# Patient Record
Sex: MALE | Race: WHITE | URBAN - METROPOLITAN AREA
[De-identification: names, ages, dates, MRNs, and addresses within clinical notes are randomized per-mention and may not be internally consistent; named-entity substitution may affect disease eponyms.]

---

## 2017-02-16 ENCOUNTER — OFFICE VISIT (OUTPATIENT)
Dept: INTERNAL MEDICINE | Facility: CLINIC | Age: 22
End: 2017-02-16
Payer: COMMERCIAL

## 2017-02-16 VITALS
OXYGEN SATURATION: 97 % | BODY MASS INDEX: 19.67 KG/M2 | DIASTOLIC BLOOD PRESSURE: 58 MMHG | HEART RATE: 87 BPM | TEMPERATURE: 98.1 F | WEIGHT: 132.8 LBS | HEIGHT: 69 IN | SYSTOLIC BLOOD PRESSURE: 110 MMHG

## 2017-02-16 DIAGNOSIS — F31.9 BIPOLAR 1 DISORDER (H): Primary | ICD-10-CM

## 2017-02-16 PROCEDURE — 99203 OFFICE O/P NEW LOW 30 MIN: CPT | Performed by: INTERNAL MEDICINE

## 2017-02-16 RX ORDER — DIVALPROEX SODIUM 500 MG/1
1000 TABLET, EXTENDED RELEASE ORAL DAILY
Qty: 60 TABLET | Refills: 2 | Status: SHIPPED | OUTPATIENT
Start: 2017-02-16 | End: 2018-06-18

## 2017-02-16 NOTE — NURSING NOTE
"Chief Complaint   Patient presents with     Establish Care     Needs medication for bipolar.       Initial /58 (BP Location: Right arm, Patient Position: Chair, Cuff Size: Adult Large)  Pulse 87  Temp 98.1  F (36.7  C) (Oral)  Ht 5' 9\" (1.753 m)  Wt 132 lb 12.8 oz (60.2 kg)  SpO2 97%  BMI 19.61 kg/m2 Estimated body mass index is 19.61 kg/(m^2) as calculated from the following:    Height as of this encounter: 5' 9\" (1.753 m).    Weight as of this encounter: 132 lb 12.8 oz (60.2 kg).  Medication Reconciliation: complete   Alisha Hopson MA      "

## 2017-02-16 NOTE — PROGRESS NOTES
"  SUBJECTIVE:                                                    Quinn Freeman is a 21 year old male who presents to clinic today for the following health issues:      He is a new patient to establish care. He has bipolar disorder and has been having problems getting his medication refilled from his psychiatrist, Dr. Cuellar. He would be interested in following here with our psych NP. He has been doing very well on current medication for the past year.     No other concerns.   No other significant medical problems.     Patient Active Problem List   Diagnosis     Attention deficit disorder     Health Care Home     Bipolar 1 disorder (H)     Current Outpatient Prescriptions   Medication Sig Dispense Refill     divalproex (DEPAKOTE ER) 500 MG 24 hr tablet Take 2 tablets (1,000 mg) by mouth daily 60 tablet 2      Social History   Substance Use Topics     Smoking status: Passive Smoke Exposure - Never Smoker     Smokeless tobacco: Never Used     Alcohol use No          ROS:  negative    OBJECTIVE:                                                    /58 (BP Location: Right arm, Patient Position: Chair, Cuff Size: Adult Large)  Pulse 87  Temp 98.1  F (36.7  C) (Oral)  Ht 5' 9\" (1.753 m)  Wt 132 lb 12.8 oz (60.2 kg)  SpO2 97%  BMI 19.61 kg/m2  Body mass index is 19.61 kg/(m^2).      Not examined.      ASSESSMENT/PLAN:                                                            1. Bipolar 1 disorder (H)  Will order med for now and refer for psych care.   - divalproex (DEPAKOTE ER) 500 MG 24 hr tablet; Take 2 tablets (1,000 mg) by mouth daily  Dispense: 60 tablet; Refill: 2  - MENTAL HEALTH REFERRAL        Milka Smith MD  [facility]    "

## 2017-02-16 NOTE — MR AVS SNAPSHOT
After Visit Summary   2/16/2017    Quinn Freeman    MRN: [id number]           Patient Information     Date Of Birth          1995        Visit Information        Provider Department      2/16/2017 5:00 PM Milka Smith MD [facility]        Today's Diagnoses     Bipolar 1 disorder (H)    -  1       Follow-ups after your visit        Additional Services     MENTAL HEALTH REFERRAL       Your provider has referred you to: [facility]: [facility] [phone]   [email or web address]   *Referral from [facility] Primary Care Provider required - Consultation Model - medication management & future refills will be returned to [facility] PCP upon completion of evaluation  *Please call to schedule an appointment.   With SOTERO Guerrero CNP    All scheduling is subject to the client's specific insurance plan & benefits, provider/location availability, and provider clinical specialities.  Please arrive 15 minutes early for your first appointment and bring your completed paperwork.    Please be aware that coverage of these services is subject to the terms and limitations of your health insurance plan.  Call member services at your health plan with any benefit or coverage questions.                  Who to contact     If you have questions or need follow up information about today's clinic visit or your schedule please contact [facility] directly at [phone].  Normal or non-critical lab and imaging results will be communicated to you by MyChart, letter or phone within 4 business days after the clinic has received the results. If you do not hear from us within 7 days, please contact the clinic through MyChart or phone. If you have a critical or abnormal lab result, we will notify you by phone as soon as possible.  Submit refill requests through [employer] or  "call your pharmacy and they will forward the refill request to us. Please allow 3 business days for your refill to be completed.          Additional Information About Your Visit        MyChart Information     [employer] lets you send messages to your doctor, view your test results, renew your prescriptions, schedule appointments and more. To sign up, go to [email or web address] . Click on \"Log in\" on the left side of the screen, which will take you to the Welcome page. Then click on \"Sign up Now\" on the right side of the page.     You will be asked to enter the access code listed below, as well as some personal information. Please follow the directions to create your username and password.     Your access code is: [email or web address]  Expires: 2017  5:03 PM     Your access code will  in 90 days. If you need help or a new code, please call your [facility] clinic or [phone].        Care EveryWhere ID     This is your Care EveryWhere ID. This could be used by other organizations to access your [facility] medical records  [id number]        Your Vitals Were     Pulse Temperature Height Pulse Oximetry BMI (Body Mass Index)       87 98.1  F (36.7  C) (Oral) 5' 9\" (1.753 m) 97% 19.61 kg/m2        Blood Pressure from Last 3 Encounters:   17 110/58   07/16/15 103/66   12 102/68    Weight from Last 3 Encounters:   17 132 lb 12.8 oz (60.2 kg)   07/10/15 129 lb (58.5 kg)   12 128 lb (58.1 kg) (20 %)*     * Growth percentiles are based on CDC 2-20 Years data.              We Performed the Following     MENTAL HEALTH REFERRAL          Today's Medication Changes          These changes are accurate as of: 17  5:03 PM.  If you have any questions, ask your nurse or doctor.               These medicines have changed or have updated prescriptions.        Dose/Directions    * DEPAKOTE PO   This may have changed:  Another medication with the same name was added. Make sure you understand how and when to " take each.        Dose:  500 mg   Take 500 mg by mouth 2 times daily Reported on 2/16/2017   Refills:  0       * divalproex 500 MG 24 hr tablet   Commonly known as:  DEPAKOTE ER   This may have changed:  You were already taking a medication with the same name, and this prescription was added. Make sure you understand how and when to take each.   Used for:  Bipolar 1 disorder (H)   Changed by:  Milka Smith MD        Dose:  1000 mg   Take 2 tablets (1,000 mg) by mouth daily   Quantity:  60 tablet   Refills:  2       * Notice:  This list has 2 medication(s) that are the same as other medications prescribed for you. Read the directions carefully, and ask your doctor or other care provider to review them with you.         Where to get your medicines      Some of these will need a paper prescription and others can be bought over the counter.  Ask your nurse if you have questions.     Bring a paper prescription for each of these medications     divalproex 500 MG 24 hr tablet                Primary Care Provider Office Phone # Fax #    Prashant Lind [phone]       [address]  [address]  [facility] [address]        Thank you!     Thank you for choosing [facility]  for your care. Our goal is always to provide you with excellent care. Hearing back from our patients is one way we can continue to improve our services. Please take a few minutes to complete the written survey that you may receive in the mail after your visit with us. Thank you!             Your Updated Medication List - Protect others around you: Learn how to safely use, store and throw away your medicines at www.disposemymeds.org.          This list is accurate as of: 2/16/17  5:03 PM.  Always use your most recent med list.                   Brand Name Dispense Instructions for use    * DEPAKOTE PO      Take 500 mg by mouth 2 times daily Reported on 2/16/2017       * divalproex 500 MG 24 hr tablet    DEPAKOTE ER     60 tablet    Take 2 tablets (1,000 mg) by mouth daily       * lithium 300 MG CR tablet    ESKALITH/LITHOBID    60 tablet    Take 2 tablets (600 mg) by mouth every evening       * lithium 300 MG CR tablet    ESKALITH/LITHOBID    60 tablet    Take 2 tablets (600 mg) by mouth every morning       OLANZapine 10 MG tablet    zyPREXA    30 tablet    Take 1 tablet (10 mg) by mouth At Bedtime       * Notice:  This list has 4 medication(s) that are the same as other medications prescribed for you. Read the directions carefully, and ask your doctor or other care provider to review them with you.

## 2018-06-18 ENCOUNTER — OFFICE VISIT (OUTPATIENT)
Dept: FAMILY MEDICINE | Facility: CLINIC | Age: 23
End: 2018-06-18
Payer: COMMERCIAL

## 2018-06-18 VITALS
SYSTOLIC BLOOD PRESSURE: 120 MMHG | HEIGHT: 70 IN | OXYGEN SATURATION: 97 % | TEMPERATURE: 98.9 F | BODY MASS INDEX: 19.76 KG/M2 | WEIGHT: 138 LBS | DIASTOLIC BLOOD PRESSURE: 70 MMHG | RESPIRATION RATE: 18 BRPM | HEART RATE: 84 BPM

## 2018-06-18 DIAGNOSIS — F31.9 BIPOLAR 1 DISORDER (H): ICD-10-CM

## 2018-06-18 DIAGNOSIS — Z00.01 ENCOUNTER FOR ROUTINE ADULT HEALTH EXAMINATION WITH ABNORMAL FINDINGS: Primary | ICD-10-CM

## 2018-06-18 PROCEDURE — 99395 PREV VISIT EST AGE 18-39: CPT | Performed by: PHYSICIAN ASSISTANT

## 2018-06-18 PROCEDURE — 99213 OFFICE O/P EST LOW 20 MIN: CPT | Mod: 25 | Performed by: PHYSICIAN ASSISTANT

## 2018-06-18 RX ORDER — DIVALPROEX SODIUM 500 MG/1
1000 TABLET, EXTENDED RELEASE ORAL DAILY
Qty: 60 TABLET | Refills: 2 | Status: SHIPPED | OUTPATIENT
Start: 2018-06-18

## 2018-06-18 ASSESSMENT — ENCOUNTER SYMPTOMS
HEMATURIA: 0
DYSURIA: 0
CONSTIPATION: 0
DIARRHEA: 0
HEADACHES: 0
EYE PAIN: 0
NAUSEA: 0
ARTHRALGIAS: 0
SORE THROAT: 0
PALPITATIONS: 1
COUGH: 1
SHORTNESS OF BREATH: 0
FREQUENCY: 0
NERVOUS/ANXIOUS: 0
ABDOMINAL PAIN: 0
PARESTHESIAS: 0
HEMATOCHEZIA: 0
FEVER: 0
JOINT SWELLING: 0
CHILLS: 0
DIZZINESS: 0
WEAKNESS: 0
MYALGIAS: 0
HEARTBURN: 0

## 2018-06-18 NOTE — PATIENT INSTRUCTIONS
Juaquin,    Really nice to meet you. I do want to encourage you to see your psychiatrist to help with mood changes you discussed.  Depakote is one option, like the prescription from 6/9/18 that you showed me, and the one that you were prescribed here at [facility] in 2017. I will refill this for 3 months until you can get back in with a psychiatrist.      Preventive Health Recommendations  Male Ages 18 - 25     Yearly exam:             See your health care provider every year in order to  o   Review health changes.   o   Discuss preventive care.    o   Review your medicines if your doctor has prescribed any.    You should be tested each year for STDs (sexually transmitted diseases).     Talk to your provider about cholesterol testing.      If you are at risk for diabetes, you should have a diabetes test (fasting glucose).    Shots: Get a flu shot each year. Get a tetanus shot every 10 years.     Nutrition:    Eat at least 5 servings of fruits and vegetables daily.     Eat whole-grain bread, whole-wheat pasta and brown rice instead of white grains and rice.     Talk to your provider about calcium and Vitamin D.     Lifestyle    Exercise for at least 150 minutes a week (30 minutes a day, 5 days a week). This will help you control your weight and prevent disease.     Limit alcohol to one drink per day.     No smoking.     Wear sunscreen to prevent skin cancer.     See your dentist every six months for an exam and cleaning.

## 2018-06-18 NOTE — MR AVS SNAPSHOT
After Visit Summary   6/18/2018    Quinn Freeman    MRN: [id number]           Patient Information     Date Of Birth          1995        Visit Information        Provider Department      6/18/2018 4:00 PM Kevin Hidalgo PA-C [facility]        Today's Diagnoses     Bipolar 1 disorder (H)          Care Instructions    Juaquin,    Really nice to meet you. I do want to encourage you to see your psychiatrist to help with mood changes you discussed.  Depakote is one option, like the prescription from 6/9/18 that you showed me, and the one that you were prescribed here at [facility] in 2017. I will refill this for 3 months until you can get back in with a psychiatrist.      Preventive Health Recommendations  Male Ages 18 - 25     Yearly exam:             See your health care provider every year in order to  o   Review health changes.   o   Discuss preventive care.    o   Review your medicines if your doctor has prescribed any.    You should be tested each year for STDs (sexually transmitted diseases).     Talk to your provider about cholesterol testing.      If you are at risk for diabetes, you should have a diabetes test (fasting glucose).    Shots: Get a flu shot each year. Get a tetanus shot every 10 years.     Nutrition:    Eat at least 5 servings of fruits and vegetables daily.     Eat whole-grain bread, whole-wheat pasta and brown rice instead of white grains and rice.     Talk to your provider about calcium and Vitamin D.     Lifestyle    Exercise for at least 150 minutes a week (30 minutes a day, 5 days a week). This will help you control your weight and prevent disease.     Limit alcohol to one drink per day.     No smoking.     Wear sunscreen to prevent skin cancer.     See your dentist every six months for an exam and cleaning.             Follow-ups after your visit        Follow-up notes from your care team     Return in about 1 year (around 6/18/2019) for Physical Exam.  "     Who to contact     If you have questions or need follow up information about today's clinic visit or your schedule please contact [facility] directly at [phone].  Normal or non-critical lab and imaging results will be communicated to you by MyChart, letter or phone within 4 business days after the clinic has received the results. If you do not hear from us within 7 days, please contact the clinic through MyChart or phone. If you have a critical or abnormal lab result, we will notify you by phone as soon as possible.  Submit refill requests through PowerInbox or call your pharmacy and they will forward the refill request to us. Please allow 3 business days for your refill to be completed.          Additional Information About Your Visit        GizmoxharTube2Tone Information     PowerInbox lets you send messages to your doctor, view your test results, renew your prescriptions, schedule appointments and more. To sign up, go to [email or web address] . Click on \"Log in\" on the left side of the screen, which will take you to the Welcome page. Then click on \"Sign up Now\" on the right side of the page.     You will be asked to enter the access code listed below, as well as some personal information. Please follow the directions to create your username and password.     Your access code is: [email or web address]  Expires: 2018  4:43 PM     Your access code will  in 90 days. If you need help or a new code, please call your [facility] clinic or [phone].        Care EveryWhere ID     This is your Care EveryWhere ID. This could be used by other organizations to access your [facility] medical records  [id number]        Your Vitals Were     Pulse Temperature Respirations Height Pulse Oximetry BMI (Body Mass Index)    84 98.9  F (37.2  C) (Tympanic) 18 5' 9.5\" (1.765 m) 97% 20.09 kg/m2       Blood Pressure from Last 3 Encounters:   18 120/70   17 110/58   07/16/15 103/66    Weight from Last 3 Encounters: "   06/18/18 138 lb (62.6 kg)   02/16/17 132 lb 12.8 oz (60.2 kg)   07/10/15 129 lb (58.5 kg)              Today, you had the following     No orders found for display         Where to get your medicines      These medications were sent to [id number] - [facility] [address] AT [place] [address]  [id number] [place] [id number]     Phone:  [phone]     divalproex sodium extended-release 500 MG 24 hr tablet          Primary Care Provider Office Phone # Fax #    Milka Smith [phone]       [facility] NICOLLET [address] [address]        Equal Access to Services     LUZ MARIA MILLS : Hadii aad ku hadasho Soomaali, waaxda luqadaha, qaybta kaalmada adeegyada, catracho albarado . So [facility] [phone].    ATENCIÓN: Si habla español, tiene a donaldson disposición servicios gratuitos de asistencia lingüística. [facility] [phone].    We comply with applicable federal civil rights laws and [place] laws. We do not discriminate on the basis of race, color, national origin, age, disability, sex, sexual orientation, or gender identity.            Thank you!     Thank you for choosing [facility]  for your care. Our goal is always to provide you with excellent care. Hearing back from our patients is one way we can continue to improve our services. Please take a few minutes to complete the written survey that you may receive in the mail after your visit with us. Thank you!             Your Updated Medication List - Protect others around you: Learn how to safely use, store and throw away your medicines at www.disposemymeds.org.          This list is accurate as of 6/18/18  4:43 PM.  Always use your most recent med list.                   Brand Name Dispense Instructions for use Diagnosis    divalproex sodium extended-release 500 MG 24 hr tablet    DEPAKOTE ER    60 tablet    Take 2 tablets (1,000 mg) by mouth daily     Bipolar 1 disorder (H)

## 2018-06-18 NOTE — PROGRESS NOTES
SUBJECTIVE:   CC: Quinn Freeman is an 23 year old male who presents for preventative health visit.     Physical   Annual:     Getting at least 3 servings of Calcium per day::  NO    Bi-annual eye exam::  NO    Dental care twice a year::  NO    Sleep apnea or symptoms of sleep apnea::  None    Diet::  Regular (no restrictions)    Frequency of exercise::  2-3 days/week    Duration of exercise::  15-30 minutes    Taking medications regularly::  Yes    Medication side effects::  Other    Additional concerns today::  YES (Medication discussion/refills )          Patient is a 22yo male presenting for annual physical  He has concerns as below  He has a hx of possible bipolar but denies this as a true diagnoses   -was on a trial of multiple medications and notes he got ptsd from those experiences    Today's PHQ-2 Score:   PHQ-2 ( 1999 Pfizer) 6/18/2018   Q1: Little interest or pleasure in doing things 0   Q2: Feeling down, depressed or hopeless 0   PHQ-2 Score 0   Q1: Little interest or pleasure in doing things Not at all   Q2: Feeling down, depressed or hopeless Not at all   PHQ-2 Score 0       Abuse: Current or Past(Physical, Sexual or Emotional)- No  Do you feel safe in your environment - Yes    Social History   Substance Use Topics     Smoking status: Passive Smoke Exposure - Never Smoker     Smokeless tobacco: Never Used     Alcohol use No     Alcohol Use 6/18/2018   If you drink alcohol do you typically have greater than 3 drinks per day OR greater than 7 drinks per week? No       Last PSA: No results found for: PSA    Reviewed orders with patient. Reviewed health maintenance and updated orders accordingly - Yes  Labs reviewed in EPIC    Reviewed and updated as needed this visit by clinical staff  Tobacco  Allergies  Meds  Med Hx  Surg Hx  Fam Hx  Soc Hx        Reviewed and updated as needed this visit by Provider            Review of Systems   Constitutional: Negative for chills and fever.   HENT: Negative for  "congestion, ear pain, hearing loss and sore throat.    Eyes: Negative for pain.   Respiratory: Positive for cough (he notes a periodic dry cough, \"normal cough\", starting to go away; thinks it was from zyprexa?). Negative for shortness of breath.    Cardiovascular: Positive for palpitations (pt notes while in [place] experienced worry about new environment, a bit anxious). Negative for chest pain and peripheral edema.   Gastrointestinal: Negative for abdominal pain, constipation, diarrhea, heartburn, hematochezia and nausea.   Genitourinary: Positive for impotence (he has noted a decrease in his sex drive; this is not a problem with erections). Negative for discharge, dysuria, frequency, genital sores, hematuria and urgency.   Musculoskeletal: Negative for arthralgias, joint swelling and myalgias.   Skin: Negative for rash.   Neurological: Negative for dizziness, weakness, headaches and paresthesias.   Psychiatric/Behavioral: Positive for mood changes (He notes a past diagnosis of Bilpolar but he disagrees with this; has been experienced some mood changes; has seen multiple providers and actually was on medical cannibis ). The patient is not nervous/anxious.        OBJECTIVE:   /70 (BP Location: Right arm, Patient Position: Chair, Cuff Size: Adult Regular)  Pulse 84  Temp 98.9  F (37.2  C) (Tympanic)  Resp 18  Ht 5' 9.5\" (1.765 m)  Wt 138 lb (62.6 kg)  SpO2 97%  BMI 20.09 kg/m2    Physical Exam  GENERAL: healthy, alert and no distress  EYES: Eyes grossly normal to inspection, PERRL and conjunctivae and sclerae normal  HENT: ear canals and TM's normal, nose and mouth without ulcers or lesions  NECK: no adenopathy, no asymmetry, masses, or scars and thyroid normal to palpation  RESP: lungs clear to auscultation - no rales, rhonchi or wheezes  CV: regular rate and rhythm, normal S1 S2, no S3 or S4, no murmur, click or rub, no peripheral edema and peripheral pulses strong  ABDOMEN: soft, nontender, no " "hepatosplenomegaly, no masses and bowel sounds normal  MS: no gross musculoskeletal defects noted, no edema  SKIN: no suspicious lesions or rashes  NEURO: Normal strength and tone  PSYCH: tangential, speech pressured and judgement and insight impaired; grandiose thinking    ASSESSMENT/PLAN:   1. Encounter for routine adult health examination with abnormal findings  24yo male for annual physical. No gross physical findings. See below for concerns. He is up to date on tdap.     2. Bipolar 1 disorder (H)  Patient is evidently manic today. I did review this with him and my opinion that he needs to see psychiatry. He agreed. He did request to go back on depakote and I am ok with starting 3 months today but he'll need further refills from psych. I reviewed the side effects of the medications with him  - divalproex sodium extended-release (DEPAKOTE ER) 500 MG 24 hr tablet; Take 2 tablets (1,000 mg) by mouth daily  Dispense: 60 tablet; Refill: 2    COUNSELING:   Reviewed preventive health counseling, as reflected in patient instructions       Regular exercise       Healthy diet/nutrition         reports that he is a non-smoker but has been exposed to tobacco smoke. He has never used smokeless tobacco.    Estimated body mass index is 20.09 kg/(m^2) as calculated from the following:    Height as of this encounter: 5' 9.5\" (1.765 m).    Weight as of this encounter: 138 lb (62.6 kg).       Counseling Resources:  ATP IV Guidelines  Pooled Cohorts Equation Calculator  FRAX Risk Assessment  ICSI Preventive Guidelines  Dietary Guidelines for Americans, 2010  USDA's MyPlate  ASA Prophylaxis  Lung CA Screening    Kevin Hidalgo PA-C  [facility]  Answers for HPI/ROS submitted by the patient on 6/18/2018   PHQ-2 Score: 0    "